# Patient Record
Sex: FEMALE | Race: WHITE | NOT HISPANIC OR LATINO | Employment: UNEMPLOYED | URBAN - METROPOLITAN AREA
[De-identification: names, ages, dates, MRNs, and addresses within clinical notes are randomized per-mention and may not be internally consistent; named-entity substitution may affect disease eponyms.]

---

## 2022-04-11 ENCOUNTER — HISTORICAL (OUTPATIENT)
Dept: ADMINISTRATIVE | Facility: HOSPITAL | Age: 47
End: 2022-04-11

## 2022-04-29 VITALS
WEIGHT: 140.63 LBS | SYSTOLIC BLOOD PRESSURE: 134 MMHG | BODY MASS INDEX: 26.55 KG/M2 | OXYGEN SATURATION: 98 % | DIASTOLIC BLOOD PRESSURE: 78 MMHG | HEIGHT: 61 IN

## 2023-08-21 DIAGNOSIS — I10 HYPERTENSION, UNSPECIFIED TYPE: Primary | ICD-10-CM

## 2023-08-22 RX ORDER — HYDROCHLOROTHIAZIDE 12.5 MG/1
12.5 CAPSULE ORAL
Qty: 30 CAPSULE | Refills: 2 | Status: SHIPPED | OUTPATIENT
Start: 2023-08-22 | End: 2023-11-01

## 2023-11-01 DIAGNOSIS — I10 HYPERTENSION, UNSPECIFIED TYPE: ICD-10-CM

## 2023-11-01 RX ORDER — LISINOPRIL 10 MG/1
10 TABLET ORAL
Qty: 30 TABLET | Refills: 0 | Status: SHIPPED | OUTPATIENT
Start: 2023-11-01 | End: 2023-11-15 | Stop reason: SDUPTHER

## 2023-11-01 RX ORDER — HYDROCHLOROTHIAZIDE 12.5 MG/1
12.5 CAPSULE ORAL
Qty: 30 CAPSULE | Refills: 0 | Status: SHIPPED | OUTPATIENT
Start: 2023-11-01 | End: 2024-03-14

## 2023-11-06 ENCOUNTER — TELEPHONE (OUTPATIENT)
Dept: FAMILY MEDICINE | Facility: CLINIC | Age: 48
End: 2023-11-06
Payer: COMMERCIAL

## 2023-11-06 VITALS
TEMPERATURE: 98 F | OXYGEN SATURATION: 97 % | HEART RATE: 73 BPM | SYSTOLIC BLOOD PRESSURE: 118 MMHG | HEIGHT: 61 IN | BODY MASS INDEX: 27.43 KG/M2 | DIASTOLIC BLOOD PRESSURE: 72 MMHG | WEIGHT: 145.31 LBS

## 2023-11-06 RX ORDER — CONJUGATED ESTROGENS 0.62 MG/G
CREAM VAGINAL
COMMUNITY
Start: 2022-03-16 | End: 2024-03-14

## 2023-11-07 ENCOUNTER — TELEPHONE (OUTPATIENT)
Dept: FAMILY MEDICINE | Facility: CLINIC | Age: 48
End: 2023-11-07
Payer: COMMERCIAL

## 2023-11-09 PROCEDURE — 84443 ASSAY THYROID STIM HORMONE: CPT | Performed by: NURSE PRACTITIONER

## 2023-11-09 PROCEDURE — 85025 COMPLETE CBC W/AUTO DIFF WBC: CPT | Performed by: NURSE PRACTITIONER

## 2023-11-09 PROCEDURE — 83036 HEMOGLOBIN GLYCOSYLATED A1C: CPT | Performed by: NURSE PRACTITIONER

## 2023-11-09 PROCEDURE — 80053 COMPREHEN METABOLIC PANEL: CPT | Performed by: NURSE PRACTITIONER

## 2023-11-09 PROCEDURE — 80061 LIPID PANEL: CPT | Performed by: NURSE PRACTITIONER

## 2023-11-14 ENCOUNTER — TELEPHONE (OUTPATIENT)
Dept: FAMILY MEDICINE | Facility: CLINIC | Age: 48
End: 2023-11-14
Payer: COMMERCIAL

## 2023-11-15 ENCOUNTER — OFFICE VISIT (OUTPATIENT)
Dept: FAMILY MEDICINE | Facility: CLINIC | Age: 48
End: 2023-11-15
Payer: COMMERCIAL

## 2023-11-15 VITALS
TEMPERATURE: 98 F | HEART RATE: 78 BPM | OXYGEN SATURATION: 99 % | SYSTOLIC BLOOD PRESSURE: 120 MMHG | WEIGHT: 153.63 LBS | DIASTOLIC BLOOD PRESSURE: 64 MMHG | BODY MASS INDEX: 29.01 KG/M2 | HEIGHT: 61 IN

## 2023-11-15 DIAGNOSIS — Z00.00 WELLNESS EXAMINATION: Primary | ICD-10-CM

## 2023-11-15 DIAGNOSIS — I10 PRIMARY HYPERTENSION: ICD-10-CM

## 2023-11-15 DIAGNOSIS — I10 HYPERTENSION, UNSPECIFIED TYPE: ICD-10-CM

## 2023-11-15 DIAGNOSIS — D72.819 LEUKOPENIA, UNSPECIFIED TYPE: ICD-10-CM

## 2023-11-15 DIAGNOSIS — J01.10 ACUTE NON-RECURRENT FRONTAL SINUSITIS: ICD-10-CM

## 2023-11-15 DIAGNOSIS — Z12.11 SCREENING FOR COLON CANCER: ICD-10-CM

## 2023-11-15 PROBLEM — E66.3 OVERWEIGHT WITH BODY MASS INDEX (BMI) 25.0-29.9: Status: ACTIVE | Noted: 2023-11-15

## 2023-11-15 PROCEDURE — 3044F PR MOST RECENT HEMOGLOBIN A1C LEVEL <7.0%: ICD-10-PCS | Mod: CPTII,,, | Performed by: NURSE PRACTITIONER

## 2023-11-15 PROCEDURE — 3008F BODY MASS INDEX DOCD: CPT | Mod: CPTII,,, | Performed by: NURSE PRACTITIONER

## 2023-11-15 PROCEDURE — 3074F PR MOST RECENT SYSTOLIC BLOOD PRESSURE < 130 MM HG: ICD-10-PCS | Mod: CPTII,,, | Performed by: NURSE PRACTITIONER

## 2023-11-15 PROCEDURE — 1160F PR REVIEW ALL MEDS BY PRESCRIBER/CLIN PHARMACIST DOCUMENTED: ICD-10-PCS | Mod: CPTII,,, | Performed by: NURSE PRACTITIONER

## 2023-11-15 PROCEDURE — 3078F DIAST BP <80 MM HG: CPT | Mod: CPTII,,, | Performed by: NURSE PRACTITIONER

## 2023-11-15 PROCEDURE — 1160F RVW MEDS BY RX/DR IN RCRD: CPT | Mod: CPTII,,, | Performed by: NURSE PRACTITIONER

## 2023-11-15 PROCEDURE — 3078F PR MOST RECENT DIASTOLIC BLOOD PRESSURE < 80 MM HG: ICD-10-PCS | Mod: CPTII,,, | Performed by: NURSE PRACTITIONER

## 2023-11-15 PROCEDURE — 1159F PR MEDICATION LIST DOCUMENTED IN MEDICAL RECORD: ICD-10-PCS | Mod: CPTII,,, | Performed by: NURSE PRACTITIONER

## 2023-11-15 PROCEDURE — 99396 PR PREVENTIVE VISIT,EST,40-64: ICD-10-PCS | Mod: ,,, | Performed by: NURSE PRACTITIONER

## 2023-11-15 PROCEDURE — 3008F PR BODY MASS INDEX (BMI) DOCUMENTED: ICD-10-PCS | Mod: CPTII,,, | Performed by: NURSE PRACTITIONER

## 2023-11-15 PROCEDURE — 3074F SYST BP LT 130 MM HG: CPT | Mod: CPTII,,, | Performed by: NURSE PRACTITIONER

## 2023-11-15 PROCEDURE — 4010F ACE/ARB THERAPY RXD/TAKEN: CPT | Mod: CPTII,,, | Performed by: NURSE PRACTITIONER

## 2023-11-15 PROCEDURE — 99213 PR OFFICE/OUTPT VISIT, EST, LEVL III, 20-29 MIN: ICD-10-PCS | Mod: 25,,, | Performed by: NURSE PRACTITIONER

## 2023-11-15 PROCEDURE — 99396 PREV VISIT EST AGE 40-64: CPT | Mod: ,,, | Performed by: NURSE PRACTITIONER

## 2023-11-15 PROCEDURE — 99213 OFFICE O/P EST LOW 20 MIN: CPT | Mod: 25,,, | Performed by: NURSE PRACTITIONER

## 2023-11-15 PROCEDURE — 3044F HG A1C LEVEL LT 7.0%: CPT | Mod: CPTII,,, | Performed by: NURSE PRACTITIONER

## 2023-11-15 PROCEDURE — 1159F MED LIST DOCD IN RCRD: CPT | Mod: CPTII,,, | Performed by: NURSE PRACTITIONER

## 2023-11-15 PROCEDURE — 4010F PR ACE/ARB THEARPY RXD/TAKEN: ICD-10-PCS | Mod: CPTII,,, | Performed by: NURSE PRACTITIONER

## 2023-11-15 RX ORDER — AZITHROMYCIN 250 MG/1
TABLET, FILM COATED ORAL
Qty: 6 TABLET | Refills: 0 | Status: SHIPPED | OUTPATIENT
Start: 2023-11-15 | End: 2023-11-20

## 2023-11-15 RX ORDER — LISINOPRIL 10 MG/1
10 TABLET ORAL DAILY
Qty: 90 TABLET | Refills: 3 | Status: SHIPPED | OUTPATIENT
Start: 2023-11-15 | End: 2024-03-14 | Stop reason: SDUPTHER

## 2023-11-15 NOTE — PROGRESS NOTES
Patient ID: Hiral Rees  : 1975    Chief Complaint: wellness    Allergies: Patient is allergic to penicillin and chlorpheniramine-pseudoephed.     History of Present Illness:  The patient is a 48 y.o. White female who presents to clinic for annual wellness visit.    Diet and nutrition:  Diet is high in salt, high in fat, low in fiber, high caloric intake, high carbohydrate meals, high calcium intake.    Fracture risk: No history of fracture, no recent unexplained fracture.    Physical activity:  Does not exercise on a regular basis, good physical condition.    Depression risks:  No history of depression, never feel sad, empty, or tearful, no sleep disturbances, no agitation, no loss of energy, no feelings of worthlessness or guilt, no thoughts of suicide.    Orientation:  No disorientation to time, no disorientation to place.    Concentration and memory:  No decreased concentration ability, no memory lapses or loss, does not forget words.    Speech forward/motor difficulties: No speech difficulties, no difficulty expressing formulated concepts, no difficulty with fine manipulative tasks, no difficulty writing forward/copying, no slowed reaction time, does not knock things over when trying to pick them up.    Fall risk assessment:  No frequent falls while walking, no fall in the past year, no dizziness forward/vertigo, no fear of falling.  Hearing:  No loss of hearing, does not wear hearing aids.    Vision:  No vision problems, does not wear glasses.    Activity of daily living: Able to bathe with limited or no assistance, able to control urination and bowels, able to dress with limited or no assistance, able to feed self with limited or no assistance, able to get out of chair or bed with limited or no assistance, able to [place] with limited or no assistance, able to toilet with limited are no assistance.    Activities of daily living:  Able to do housework with limited or no assistance, able to grocery shop  with limited or no assistance, able to manage medications with limited or no assistance, able to manage money with limited or no assistance, able to prepare meals with limited or no assistance, able to use the phone with limited or no assistance.    Screenings: due for vaccinations, due for breast cancer screening, due for cervical cancer screening, due for colorectal cancer screening.    Patient reports recent low bps. 90s/50s. Denies dizziness, light headedness, chest pain, palpitations, weakness.     Sinus Problem  This is a new problem. The current episode started 1 to 4 weeks ago. The problem has been gradually worsening since onset. There has been no fever. Associated symptoms include congestion, coughing, headaches, a hoarse voice, sinus pressure, sneezing, a sore throat and swollen glands. Pertinent negatives include no chills, diaphoresis, ear pain, neck pain or shortness of breath.        Past Medical History:  has a past medical history of BMI 27.0-27.9,adult, Chronic sinusitis, unspecified, Hypertension, and Leukopenia.    Surgical History:  has a past surgical history that includes Cholecystectomy.    Family History: family history includes Breast cancer in her paternal grandmother; Diabetes type II in her maternal grandmother; Hypertension in her mother.    Social History:  reports that she has never smoked. She has never used smokeless tobacco.    Care Team: Patient Care Team:  Fredrick Mora APRN as PCP - General (Family Medicine)  Christine Murray NP as Nurse Practitioner (Obstetrics and Gynecology)     Current Medications:  Current Outpatient Medications   Medication Instructions    azithromycin (ZITHROMAX Z-SHAAN) 250 MG tablet Take 2 tablets (500 mg total) by mouth once daily for 1 day, THEN 1 tablet (250 mg total) once daily for 4 days.    conjugated estrogens (PREMARIN) vaginal cream   See Instructions, 0.5gms vaginally q hs x 14 then 2x/week, # 30 gm, 6 Refill(s), [facility]: [facility], 155, cm, Height/Length Dosing, 03/16/22 10:53:00 CDT, 65.7, kg, Weight Dosing, 03/16/22 10:53:00 CDT    hydroCHLOROthiazide (MICROZIDE) 12.5 mg, Oral    lisinopriL 10 mg, Oral, Daily       Review of Systems   Constitutional:  Negative for appetite change, chills, diaphoresis, fatigue, fever and unexpected weight change.   HENT:  Positive for nasal congestion, hoarse voice, sinus pressure/congestion, sneezing and sore throat. Negative for ear pain, facial swelling, hearing loss, mouth sores, nosebleeds and trouble swallowing.    Eyes:  Negative for pain, discharge, redness and visual disturbance.   Respiratory:  Positive for cough. Negative for chest tightness and shortness of breath.    Cardiovascular:  Negative for chest pain, palpitations and leg swelling.   Gastrointestinal:  Negative for abdominal pain, blood in stool, constipation, diarrhea and nausea.   Endocrine: Negative for cold intolerance, heat intolerance, polydipsia, polyphagia and polyuria.   Genitourinary:  Negative for difficulty urinating, dysuria, frequency and hematuria.   Musculoskeletal:  Negative for arthralgias, joint swelling, neck pain and joint deformity.   Integumentary:  Negative for color change, rash and mole/lesion.   Neurological:  Positive for headaches. Negative for dizziness, weakness and memory loss.   Hematological:  Negative for adenopathy. Does not bruise/bleed easily.   Psychiatric/Behavioral:  Negative for confusion, sleep disturbance and suicidal ideas.         Visit Vitals  /64 (BP Location: Right arm, Patient Position: Sitting)   Pulse 78   Temp 97.9 °F (36.6 °C)   Ht 5' 1.02" (1.55 m)   Wt 69.7 kg (153 lb 9.6 oz)   SpO2 99%   BMI 29.00 kg/m²       Physical Exam  Vitals reviewed.   Constitutional:       General: She is not in acute distress.     Appearance: Normal appearance.   HENT:      Head: Normocephalic and atraumatic.      Right Ear: Ear canal and external ear normal. A middle ear " effusion is present.      Left Ear: Ear canal and external ear normal. A middle ear effusion is present.      Nose: Rhinorrhea present. No congestion. Rhinorrhea is clear.      Right Turbinates: Enlarged and swollen.      Mouth/Throat:      Mouth: Mucous membranes are moist.      Pharynx: Oropharynx is clear. Posterior oropharyngeal erythema present. No oropharyngeal exudate.   Eyes:      Extraocular Movements: Extraocular movements intact.      Conjunctiva/sclera: Conjunctivae normal.      Pupils: Pupils are equal, round, and reactive to light.   Cardiovascular:      Rate and Rhythm: Normal rate and regular rhythm.      Pulses: Normal pulses.      Heart sounds: No murmur heard.  Pulmonary:      Effort: Pulmonary effort is normal.      Breath sounds: Normal breath sounds.   Abdominal:      General: Bowel sounds are normal.      Palpations: Abdomen is soft.      Tenderness: There is no abdominal tenderness.   Musculoskeletal:         General: No swelling, tenderness or deformity. Normal range of motion.      Cervical back: Normal range of motion and neck supple.   Lymphadenopathy:      Cervical: No cervical adenopathy.   Skin:     General: Skin is warm and dry.      Capillary Refill: Capillary refill takes less than 2 seconds.      Coloration: Skin is not jaundiced.      Findings: No rash.   Neurological:      Mental Status: She is alert and oriented to person, place, and time.      Cranial Nerves: No cranial nerve deficit.   Psychiatric:         Mood and Affect: Mood normal.         Behavior: Behavior normal.          Labs Reviewed:  Chemistry:  Lab Results   Component Value Date     11/09/2023    K 4.6 11/09/2023    CHLORIDE 105 11/09/2023    BUN 19.0 11/09/2023    CREATININE 0.89 11/09/2023    EGFRNORACEVR 79 11/09/2023    GLUCOSE 94 11/09/2023    CALCIUM 10.1 11/09/2023    ALKPHOS 96 11/09/2023    LABPROT 7.5 11/09/2023    ALBUMIN 4.5 11/09/2023    AST 30 11/09/2023    ALT 23 11/09/2023    TSH 1.450  11/09/2023        Lab Results   Component Value Date    HGBA1C 5.2 11/09/2023        Hematology:  Lab Results   Component Value Date    WBC 3.76 (L) 11/09/2023    RBC 4.61 11/09/2023    HGB 13.8 11/09/2023    HCT 41.1 11/09/2023    MCV 89.2 11/09/2023    MCH 29.9 11/09/2023    MCHC 33.6 11/09/2023    RDW 12.0 11/09/2023     11/09/2023    MPV 10.7 11/09/2023       Lipid Panel:  Lab Results   Component Value Date    CHOL 187 11/09/2023    HDL 65 (H) 11/09/2023    DLDL 75.1 11/09/2023    TRIG 133 11/09/2023        Assessment & Plan:  1. Wellness examination  Overview:  Cervical Cancer Screening- established with Christine Murray, last pap with health unit, will request record, next apt 11/27/23  Breast Cancer Screening- request mammogram from health unit  Osteoporosis Screening- not due  Colon Cancer Screening - cologard ordered  Eye Exam- Recommend annually. Declines referral  Dental Exam- Recommend biannually.      2. Primary hypertension  Overview:  continue lisinopril 10 .     Assessment & Plan:  Hold hctz 12.5, monitor bp and notify office of bp greater than 140/90, hand written instructions provided.     Lab Results   Component Value Date    BUN 19.0 11/09/2023    CREATININE 0.89 11/09/2023    EGFRNORACEVR 79 11/09/2023    K 4.6 11/09/2023         Orders:  -     lisinopriL 10 MG tablet; Take 1 tablet (10 mg total) by mouth once daily.  Dispense: 90 tablet; Refill: 3    3. Leukopenia, unspecified type  Assessment & Plan:  Intermittent, low wbc last year wellness. Repeat was normal. Discussed further work up. Patient agrees to repeat in 4 months    Orders:  -     CBC Auto Differential; Future; Expected date: 03/15/2024  -     Path Review, Peripheral Smear; Future; Expected date: 03/15/2024    4. Screening for colon cancer  -     Cologuard Screening (Multitarget Stool DNA); Future; Expected date: 11/15/2023    5. Hypertension, unspecified type  Overview:  continue lisinopril 10 .     Assessment & Plan:  Hold  hctz 12.5, monitor bp and notify office of bp greater than 140/90, hand written instructions provided.     Lab Results   Component Value Date    BUN 19.0 11/09/2023    CREATININE 0.89 11/09/2023    EGFRNORACEVR 79 11/09/2023    K 4.6 11/09/2023           6. Acute non-recurrent frontal sinusitis  -     azithromycin (ZITHROMAX Z-SHAAN) 250 MG tablet; Take 2 tablets (500 mg total) by mouth once daily for 1 day, THEN 1 tablet (250 mg total) once daily for 4 days.  Dispense: 6 tablet; Refill: 0           Vaccinations:    There is no immunization history on file for this patient.    Future Appointments   Date Time Provider Department Center   3/12/2024  9:20 AM LAB, [facility] LABORATORY DRAW STATION [facility] CHRIS [facility]   3/15/2024  8:30 AM Fredrick Mora APRN [facility] JJ [facility]       Follow up for 1) 4 month f/u white count , nonfasting labs prior , 2), 1 year, Wellness, Fasting labs prior. Call sooner if needed.    SHELBY DOMINGUEZ    Lab Frequency Next Occurrence   CBC Auto Differential Once 03/15/2024   Path Review, Peripheral Smear Once 03/15/2024

## 2023-11-15 NOTE — ASSESSMENT & PLAN NOTE
Intermittent, low wbc last year wellness. Repeat was normal. Discussed further work up. Patient agrees to repeat in 4 months

## 2023-11-15 NOTE — ASSESSMENT & PLAN NOTE
Hold hctz 12.5, monitor bp and notify office of bp greater than 140/90, hand written instructions provided.     Lab Results   Component Value Date    BUN 19.0 11/09/2023    CREATININE 0.89 11/09/2023    EGFRNORACEVR 79 11/09/2023    K 4.6 11/09/2023

## 2023-11-16 ENCOUNTER — DOCUMENTATION ONLY (OUTPATIENT)
Dept: ADMINISTRATIVE | Facility: HOSPITAL | Age: 48
End: 2023-11-16
Payer: COMMERCIAL

## 2023-11-16 LAB
HPV 16: NEGATIVE
HPV 18/45 RNA, VAGINAL: NEGATIVE
HUMAN PAPILLOMAVIRUS (HPV): NORMAL
PAP RECOMMENDATION EXT: NORMAL
PAP SMEAR: NORMAL

## 2023-11-16 NOTE — PROGRESS NOTES
Population Health Chart Review & Patient Outreach Details      Health Maintenance Topics Addressed and Outreach Outcomes / Actions Taken:          Cervical Cancer Screening []  Pap Smear Scheduled in Primary Care or OBGYN    []  External Records Requested & Care Team Updated if Applicable       [x]  External Records Uploaded, Care Team Updated, & History Updated if Applicable    []  Patient Declined Scheduling Pap Smear    []  Patient Will Schedule with External Provider & Care Team Updated if Applicable     Felicia Ace MA - Panel Care Coordinator

## 2023-12-20 LAB — NONINV COLON CA DNA+OCC BLD SCRN STL QL: NEGATIVE

## 2024-02-19 PROBLEM — Z00.00 WELLNESS EXAMINATION: Status: RESOLVED | Noted: 2023-11-15 | Resolved: 2024-02-19

## 2024-03-12 PROCEDURE — 85025 COMPLETE CBC W/AUTO DIFF WBC: CPT | Performed by: NURSE PRACTITIONER

## 2024-03-14 ENCOUNTER — OFFICE VISIT (OUTPATIENT)
Dept: FAMILY MEDICINE | Facility: CLINIC | Age: 49
End: 2024-03-14
Payer: COMMERCIAL

## 2024-03-14 VITALS
OXYGEN SATURATION: 97 % | HEART RATE: 82 BPM | HEIGHT: 61 IN | DIASTOLIC BLOOD PRESSURE: 70 MMHG | TEMPERATURE: 98 F | WEIGHT: 150 LBS | BODY MASS INDEX: 28.32 KG/M2 | SYSTOLIC BLOOD PRESSURE: 124 MMHG

## 2024-03-14 DIAGNOSIS — D72.819 LEUKOPENIA, UNSPECIFIED TYPE: Primary | ICD-10-CM

## 2024-03-14 DIAGNOSIS — I10 PRIMARY HYPERTENSION: ICD-10-CM

## 2024-03-14 DIAGNOSIS — J01.10 ACUTE NON-RECURRENT FRONTAL SINUSITIS: ICD-10-CM

## 2024-03-14 DIAGNOSIS — N95.1 HOT FLASHES DUE TO MENOPAUSE: ICD-10-CM

## 2024-03-14 DIAGNOSIS — F32.A ANXIETY AND DEPRESSION: ICD-10-CM

## 2024-03-14 DIAGNOSIS — F41.9 ANXIETY AND DEPRESSION: ICD-10-CM

## 2024-03-14 PROCEDURE — 3074F SYST BP LT 130 MM HG: CPT | Mod: CPTII,,, | Performed by: NURSE PRACTITIONER

## 2024-03-14 PROCEDURE — 99214 OFFICE O/P EST MOD 30 MIN: CPT | Mod: ,,, | Performed by: NURSE PRACTITIONER

## 2024-03-14 PROCEDURE — 3078F DIAST BP <80 MM HG: CPT | Mod: CPTII,,, | Performed by: NURSE PRACTITIONER

## 2024-03-14 PROCEDURE — 1159F MED LIST DOCD IN RCRD: CPT | Mod: CPTII,,, | Performed by: NURSE PRACTITIONER

## 2024-03-14 PROCEDURE — 3008F BODY MASS INDEX DOCD: CPT | Mod: CPTII,,, | Performed by: NURSE PRACTITIONER

## 2024-03-14 PROCEDURE — 1160F RVW MEDS BY RX/DR IN RCRD: CPT | Mod: CPTII,,, | Performed by: NURSE PRACTITIONER

## 2024-03-14 PROCEDURE — 4010F ACE/ARB THERAPY RXD/TAKEN: CPT | Mod: CPTII,,, | Performed by: NURSE PRACTITIONER

## 2024-03-14 RX ORDER — ESCITALOPRAM OXALATE 10 MG/1
10 TABLET ORAL NIGHTLY
Qty: 90 TABLET | Refills: 3 | Status: SHIPPED | OUTPATIENT
Start: 2024-03-14 | End: 2024-04-23 | Stop reason: SDUPTHER

## 2024-03-14 RX ORDER — AZITHROMYCIN 250 MG/1
TABLET, FILM COATED ORAL
Qty: 6 TABLET | Refills: 0 | Status: SHIPPED | OUTPATIENT
Start: 2024-03-14 | End: 2024-03-19

## 2024-03-14 RX ORDER — LISINOPRIL 10 MG/1
10 TABLET ORAL DAILY
Qty: 90 TABLET | Refills: 3 | Status: SHIPPED | OUTPATIENT
Start: 2024-03-14

## 2024-03-14 NOTE — PROGRESS NOTES
Patient ID: Hiral Rees  : 1975     Chief Complaint: Results (Lab work )    Allergies: Patient is allergic to penicillin and chlorpheniramine-pseudoephed.     History of Present Illness:  The patient is a 48 y.o. White female who presents to clinic for evaluation and management with a chief complaint of Results (Lab work )   Took lexapro in the past after passing of her mother.     Sinus Problem  This is a new problem. The current episode started 1 to 4 weeks ago. The problem has been gradually worsening since onset. There has been no fever. Associated symptoms include congestion, coughing, headaches, a hoarse voice, sinus pressure, sneezing, a sore throat and swollen glands. Pertinent negatives include no chills, diaphoresis, ear pain, neck pain or shortness of breath.   Hypertension  This is a chronic problem. The current episode started more than 1 year ago. The problem is controlled. Associated symptoms include anxiety and headaches. Pertinent negatives include no blurred vision, chest pain, malaise/fatigue, neck pain, orthopnea, palpitations, peripheral edema, PND, shortness of breath or sweats. Past treatments include ACE inhibitors. The current treatment provides significant improvement.   Anxiety  Presents for initial visit. Onset was 1 to 6 months ago. The problem has been waxing and waning. Symptoms include depressed mood, excessive worry, irritability and nervous/anxious behavior. Patient reports no chest pain, compulsions, confusion, decreased concentration, dizziness, dry mouth, feeling of choking, hyperventilation, impotence, insomnia, malaise, muscle tension, nausea, obsessions, palpitations, panic, restlessness, shortness of breath or suicidal ideas. Symptoms occur most days. The severity of symptoms is interfering with daily activities.            Past Medical History:  has a past medical history of BMI 27.0-27.9,adult, Chronic sinusitis, unspecified, Hypertension, and  "Leukopenia.    Social History:  reports that she has never smoked. She has never used smokeless tobacco.    Care Team: Patient Care Team:  Fredrick Mora APRN as PCP - General (Family Medicine)  Christine Murray NP as Nurse Practitioner (Obstetrics and Gynecology)     Current Medications:  Current Outpatient Medications   Medication Instructions    azithromycin (Z-SHAAN) 250 MG tablet Take 2 tablets by mouth on day 1; Take 1 tablet by mouth on days 2-5<BR>    EScitalopram oxalate (LEXAPRO) 10 mg, Oral, Nightly    lisinopriL 10 mg, Oral, Daily       Review of Systems   Constitutional:  Positive for irritability. Negative for chills, diaphoresis and malaise/fatigue.   HENT:  Positive for nasal congestion, hoarse voice, sinus pressure/congestion, sneezing and sore throat. Negative for ear pain.    Eyes:  Negative for blurred vision.   Respiratory:  Positive for cough. Negative for shortness of breath.    Cardiovascular:  Negative for chest pain, palpitations, orthopnea and PND.   Gastrointestinal:  Negative for nausea.   Genitourinary:  Negative for impotence.   Musculoskeletal:  Negative for neck pain.   Neurological:  Positive for headaches. Negative for dizziness.   Psychiatric/Behavioral:  Negative for confusion, decreased concentration and suicidal ideas. The patient is nervous/anxious. The patient does not have insomnia.         Visit Vitals  /70 (BP Location: Left arm, Patient Position: Sitting, BP Method: Medium (Manual))   Pulse 82   Temp 98.2 °F (36.8 °C) (Skin)   Ht 5' 1.02" (1.55 m)   Wt 68 kg (150 lb)   SpO2 97%   BMI 28.32 kg/m²       Physical Exam  Vitals reviewed.   Constitutional:       General: She is not in acute distress.     Appearance: Normal appearance.   HENT:      Head: Normocephalic and atraumatic.      Right Ear: Ear canal and external ear normal. A middle ear effusion is present.      Left Ear: Ear canal and external ear normal. A middle ear effusion is present.      Nose: " Rhinorrhea present. No congestion. Rhinorrhea is purulent.      Left Turbinates: Enlarged and swollen.      Left Sinus: Frontal sinus tenderness present.      Mouth/Throat:      Mouth: Mucous membranes are moist.      Pharynx: Oropharynx is clear. Posterior oropharyngeal erythema present. No oropharyngeal exudate.   Eyes:      Conjunctiva/sclera: Conjunctivae normal.   Cardiovascular:      Rate and Rhythm: Normal rate and regular rhythm.      Pulses: Normal pulses.      Heart sounds: No murmur heard.  Pulmonary:      Effort: Pulmonary effort is normal.      Breath sounds: Normal breath sounds.   Abdominal:      General: Bowel sounds are normal.      Palpations: Abdomen is soft.      Tenderness: There is no abdominal tenderness.   Musculoskeletal:         General: No swelling, tenderness or deformity. Normal range of motion.      Cervical back: Normal range of motion and neck supple.   Lymphadenopathy:      Cervical: No cervical adenopathy.   Skin:     General: Skin is warm and dry.      Capillary Refill: Capillary refill takes less than 2 seconds.      Coloration: Skin is not jaundiced.      Findings: No rash.   Neurological:      Mental Status: She is alert and oriented to person, place, and time.      Cranial Nerves: No cranial nerve deficit.   Psychiatric:         Mood and Affect: Mood normal.         Behavior: Behavior normal.          Labs Reviewed:  Chemistry:  Lab Results   Component Value Date     11/09/2023    K 4.6 11/09/2023    CHLORIDE 105 11/09/2023    BUN 19.0 11/09/2023    CREATININE 0.89 11/09/2023    EGFRNORACEVR 79 11/09/2023    GLUCOSE 94 11/09/2023    CALCIUM 10.1 11/09/2023    ALKPHOS 96 11/09/2023    LABPROT 7.5 11/09/2023    ALBUMIN 4.5 11/09/2023    AST 30 11/09/2023    ALT 23 11/09/2023    TSH 1.450 11/09/2023        Lab Results   Component Value Date    HGBA1C 5.2 11/09/2023        Hematology:  Lab Results   Component Value Date    WBC 3.24 (L) 03/12/2024    RBC 4.72 03/12/2024    HGB  13.9 03/12/2024    HCT 42.1 03/12/2024    MCV 89.2 03/12/2024    MCH 29.4 03/12/2024    MCHC 33.0 03/12/2024    RDW 11.8 03/12/2024     03/12/2024    MPV 10.3 03/12/2024       Lipid Panel:  Lab Results   Component Value Date    CHOL 187 11/09/2023    HDL 65 (H) 11/09/2023    DLDL 75.1 11/09/2023    TRIG 133 11/09/2023        Assessment & Plan:  1. Leukopenia, unspecified type  Comments:  reviewed with rigo Junior , continue to monitor    2. Primary hypertension  Overview:  continue lisinopril 10 .     Orders:  -     lisinopriL 10 MG tablet; Take 1 tablet (10 mg total) by mouth once daily.  Dispense: 90 tablet; Refill: 3    3. Anxiety and depression  -     EScitalopram oxalate (LEXAPRO) 10 MG tablet; Take 1 tablet (10 mg total) by mouth every evening.  Dispense: 90 tablet; Refill: 3    4. Hot flashes due to menopause  -     EScitalopram oxalate (LEXAPRO) 10 MG tablet; Take 1 tablet (10 mg total) by mouth every evening.  Dispense: 90 tablet; Refill: 3    5. Acute non-recurrent frontal sinusitis  -     azithromycin (Z-SHAAN) 250 MG tablet; Take 2 tablets by mouth on day 1; Take 1 tablet by mouth on days 2-5  Dispense: 6 tablet; Refill: 0         Future Appointments   Date Time Provider Department Center   11/12/2024  8:40 AM LAB, [facility] LABORATORY DRAW STATION [facility] CHRIS [facility]   11/19/2024  9[facility]00 [facility]       Follow up for 6 wk f/u, Depression/Anxiety. Call sooner if needed.    SHELBY DOMINGUEZ

## 2024-03-28 LAB — BCS RECOMMENDATION EXT: NORMAL

## 2024-04-23 ENCOUNTER — PATIENT OUTREACH (OUTPATIENT)
Dept: ADMINISTRATIVE | Facility: HOSPITAL | Age: 49
End: 2024-04-23
Payer: COMMERCIAL

## 2024-04-23 ENCOUNTER — OFFICE VISIT (OUTPATIENT)
Dept: FAMILY MEDICINE | Facility: CLINIC | Age: 49
End: 2024-04-23
Payer: COMMERCIAL

## 2024-04-23 VITALS
WEIGHT: 150 LBS | DIASTOLIC BLOOD PRESSURE: 68 MMHG | HEART RATE: 76 BPM | OXYGEN SATURATION: 96 % | TEMPERATURE: 98 F | SYSTOLIC BLOOD PRESSURE: 110 MMHG | BODY MASS INDEX: 28.32 KG/M2 | HEIGHT: 61 IN

## 2024-04-23 DIAGNOSIS — F32.A ANXIETY AND DEPRESSION: Primary | ICD-10-CM

## 2024-04-23 DIAGNOSIS — J01.01 ACUTE RECURRENT MAXILLARY SINUSITIS: ICD-10-CM

## 2024-04-23 DIAGNOSIS — F41.9 ANXIETY AND DEPRESSION: Primary | ICD-10-CM

## 2024-04-23 DIAGNOSIS — N95.1 HOT FLASHES DUE TO MENOPAUSE: ICD-10-CM

## 2024-04-23 PROCEDURE — 3074F SYST BP LT 130 MM HG: CPT | Mod: CPTII,,, | Performed by: NURSE PRACTITIONER

## 2024-04-23 PROCEDURE — 1160F RVW MEDS BY RX/DR IN RCRD: CPT | Mod: CPTII,,, | Performed by: NURSE PRACTITIONER

## 2024-04-23 PROCEDURE — 1159F MED LIST DOCD IN RCRD: CPT | Mod: CPTII,,, | Performed by: NURSE PRACTITIONER

## 2024-04-23 PROCEDURE — 99214 OFFICE O/P EST MOD 30 MIN: CPT | Mod: ,,, | Performed by: NURSE PRACTITIONER

## 2024-04-23 PROCEDURE — 3078F DIAST BP <80 MM HG: CPT | Mod: CPTII,,, | Performed by: NURSE PRACTITIONER

## 2024-04-23 PROCEDURE — 3008F BODY MASS INDEX DOCD: CPT | Mod: CPTII,,, | Performed by: NURSE PRACTITIONER

## 2024-04-23 PROCEDURE — 4010F ACE/ARB THERAPY RXD/TAKEN: CPT | Mod: CPTII,,, | Performed by: NURSE PRACTITIONER

## 2024-04-23 RX ORDER — ESCITALOPRAM OXALATE 10 MG/1
10 TABLET ORAL NIGHTLY
Qty: 90 TABLET | Refills: 1 | Status: SHIPPED | OUTPATIENT
Start: 2024-04-23 | End: 2025-04-23

## 2024-04-23 RX ORDER — DOXYCYCLINE 100 MG/1
100 CAPSULE ORAL EVERY 12 HOURS
Qty: 20 CAPSULE | Refills: 0 | Status: SHIPPED | OUTPATIENT
Start: 2024-04-23 | End: 2024-05-03

## 2024-04-23 NOTE — PROGRESS NOTES
Patient ID: Hiral Rees  : 1975     Chief Complaint: Anxiety and Depression    Allergies: Patient is allergic to penicillin and chlorpheniramine-pseudoephed.     History of Present Illness:  The patient is a 48 y.o. White female who presents to clinic for evaluation and management with a chief complaint of Anxiety and Depression   Took lexapro in the past after passing of her mother.  Restarted last month and reports symptoms much improved.  Declines desire to change medication or dose at this time.  Patient reports sinus symptoms had improved with antibiotic but returned over a week ago and symptoms are worsening.  Admits to head pressure congestion, fullness in ears.    Sinus Problem  This is a new problem. The current episode started 1 to 4 weeks ago. The problem has been gradually worsening since onset. There has been no fever. Associated symptoms include congestion, coughing, a hoarse voice, sinus pressure, sneezing, a sore throat and swollen glands. Pertinent negatives include no chills, diaphoresis or ear pain.   Anxiety  Presents for initial visit. Onset was 1 to 6 months ago. The problem has been waxing and waning. Symptoms include depressed mood, excessive worry, irritability and nervous/anxious behavior. Patient reports no compulsions, confusion, decreased concentration, dizziness, dry mouth, feeling of choking, hyperventilation, impotence, insomnia, malaise, muscle tension, nausea, obsessions, panic, restlessness or suicidal ideas. Symptoms occur most days. The severity of symptoms is interfering with daily activities.            Past Medical History:  has a past medical history of BMI 27.0-27.9,adult, Chronic sinusitis, unspecified, Hypertension, and Leukopenia.    Social History:  reports that she has never smoked. She has never used smokeless tobacco. She reports that she does not drink alcohol and does not use drugs.    Care Team: Patient Care Team:  Fredrick Mora APRN as PCP - General  "(Family Medicine)  Christine Murray, KRYSTAL as Nurse Practitioner (Obstetrics and Gynecology)     Current Medications:  Current Outpatient Medications   Medication Instructions    doxycycline (VIBRAMYCIN) 100 mg, Oral, Every 12 hours    EScitalopram oxalate (LEXAPRO) 10 mg, Oral, Nightly    lisinopriL 10 mg, Oral, Daily       Review of Systems   Constitutional:  Positive for irritability. Negative for chills and diaphoresis.   HENT:  Positive for nasal congestion, hoarse voice, sinus pressure/congestion, sneezing and sore throat. Negative for ear pain.    Respiratory:  Positive for cough.    Gastrointestinal:  Negative for nausea.   Genitourinary:  Negative for impotence.   Neurological:  Negative for dizziness.   Psychiatric/Behavioral:  Negative for confusion, decreased concentration and suicidal ideas. The patient is nervous/anxious. The patient does not have insomnia.         Visit Vitals  /68 (BP Location: Right arm)   Pulse 76   Temp 97.9 °F (36.6 °C) (Temporal)   Ht 5' 1.02" (1.55 m)   Wt 68 kg (150 lb)   SpO2 96%   BMI 28.32 kg/m²       Physical Exam  Vitals reviewed.   Constitutional:       General: She is not in acute distress.     Appearance: Normal appearance.   HENT:      Head: Normocephalic and atraumatic.      Right Ear: Ear canal and external ear normal. A middle ear effusion is present.      Left Ear: Ear canal and external ear normal. A middle ear effusion is present.      Nose: Rhinorrhea present. No congestion. Rhinorrhea is purulent.      Left Turbinates: Enlarged and swollen.      Left Sinus: Frontal sinus tenderness present.      Mouth/Throat:      Mouth: Mucous membranes are moist.      Pharynx: Oropharynx is clear. Posterior oropharyngeal erythema present. No oropharyngeal exudate.   Eyes:      Conjunctiva/sclera: Conjunctivae normal.   Cardiovascular:      Rate and Rhythm: Normal rate and regular rhythm.      Pulses: Normal pulses.      Heart sounds: No murmur heard.  Pulmonary:    "   Effort: Pulmonary effort is normal.      Breath sounds: Normal breath sounds.   Abdominal:      General: Bowel sounds are normal.      Palpations: Abdomen is soft.      Tenderness: There is no abdominal tenderness.   Musculoskeletal:         General: No swelling, tenderness or deformity. Normal range of motion.      Cervical back: Normal range of motion and neck supple.   Lymphadenopathy:      Cervical: No cervical adenopathy.   Skin:     General: Skin is warm and dry.      Capillary Refill: Capillary refill takes less than 2 seconds.      Coloration: Skin is not jaundiced.      Findings: No rash.   Neurological:      Mental Status: She is alert and oriented to person, place, and time.      Cranial Nerves: No cranial nerve deficit.   Psychiatric:         Mood and Affect: Mood normal.         Behavior: Behavior normal.          Labs Reviewed:  Chemistry:  Lab Results   Component Value Date     11/09/2023    K 4.6 11/09/2023    CHLORIDE 105 11/09/2023    BUN 19.0 11/09/2023    CREATININE 0.89 11/09/2023    EGFRNORACEVR 79 11/09/2023    GLUCOSE 94 11/09/2023    CALCIUM 10.1 11/09/2023    ALKPHOS 96 11/09/2023    LABPROT 7.5 11/09/2023    ALBUMIN 4.5 11/09/2023    AST 30 11/09/2023    ALT 23 11/09/2023    TSH 1.450 11/09/2023        Lab Results   Component Value Date    HGBA1C 5.2 11/09/2023        Hematology:  Lab Results   Component Value Date    WBC 3.24 (L) 03/12/2024    RBC 4.72 03/12/2024    HGB 13.9 03/12/2024    HCT 42.1 03/12/2024    MCV 89.2 03/12/2024    MCH 29.4 03/12/2024    MCHC 33.0 03/12/2024    RDW 11.8 03/12/2024     03/12/2024    MPV 10.3 03/12/2024       Lipid Panel:  Lab Results   Component Value Date    CHOL 187 11/09/2023    HDL 65 (H) 11/09/2023    DLDL 75.1 11/09/2023    TRIG 133 11/09/2023        Assessment & Plan:  1. Anxiety and depression  Overview:  Mood stable with lexapro 10mg    Orders:  -     EScitalopram oxalate (LEXAPRO) 10 MG tablet; Take 1 tablet (10 mg total) by  mouth every evening.  Dispense: 90 tablet; Refill: 1    2. Hot flashes due to menopause  Overview:  Symptoms improved with lexapro    Orders:  -     EScitalopram oxalate (LEXAPRO) 10 MG tablet; Take 1 tablet (10 mg total) by mouth every evening.  Dispense: 90 tablet; Refill: 1    3. Acute recurrent maxillary sinusitis  Comments:  complete doxycycline, patient will notify if interested in ENT referral  Orders:  -     doxycycline (VIBRAMYCIN) 100 MG Cap; Take 1 capsule (100 mg total) by mouth every 12 (twelve) hours. for 10 days  Dispense: 20 capsule; Refill: 0         Future Appointments   Date Time Provider Department Center   11/12/2024  8:40 AM LAB, [facility] [place] LABORATORY DRAW STATION [facility] [place] CHRIS [facility]con   11/19/2024  9:00 AM Fredrick Mora APRN [facility] [place] JJ Rascon       Follow up for Keep Apt as Scheduled. Call sooner if needed.    SHELBY DOMINGUEZ

## 2024-04-23 NOTE — PROGRESS NOTES
Health Maintenance Topic(s) Outreach Outcomes & Actions Taken:    Breast Cancer Screening - Outreach Outcomes & Actions Taken  : External Records Uploaded & Care Team Updated if Applicable       Additional Notes:  Upload Mammogram Report 3/28/2024

## 2024-11-19 ENCOUNTER — OFFICE VISIT (OUTPATIENT)
Dept: FAMILY MEDICINE | Facility: CLINIC | Age: 49
End: 2024-11-19
Payer: COMMERCIAL

## 2024-11-19 VITALS
WEIGHT: 155.63 LBS | BODY MASS INDEX: 29.38 KG/M2 | TEMPERATURE: 98 F | SYSTOLIC BLOOD PRESSURE: 128 MMHG | HEIGHT: 61 IN | DIASTOLIC BLOOD PRESSURE: 72 MMHG | OXYGEN SATURATION: 96 % | HEART RATE: 84 BPM

## 2024-11-19 DIAGNOSIS — Z00.00 WELLNESS EXAMINATION: Primary | ICD-10-CM

## 2024-11-19 DIAGNOSIS — Z82.49 FAMILY HISTORY OF ABDOMINAL AORTIC ANEURYSM (AAA): ICD-10-CM

## 2024-11-19 DIAGNOSIS — I10 PRIMARY HYPERTENSION: ICD-10-CM

## 2024-11-19 DIAGNOSIS — N95.1 HOT FLASHES DUE TO MENOPAUSE: ICD-10-CM

## 2024-11-19 DIAGNOSIS — F41.9 ANXIETY AND DEPRESSION: ICD-10-CM

## 2024-11-19 DIAGNOSIS — F32.A ANXIETY AND DEPRESSION: ICD-10-CM

## 2024-11-19 PROCEDURE — 3008F BODY MASS INDEX DOCD: CPT | Mod: CPTII,,, | Performed by: NURSE PRACTITIONER

## 2024-11-19 PROCEDURE — 3044F HG A1C LEVEL LT 7.0%: CPT | Mod: CPTII,,, | Performed by: NURSE PRACTITIONER

## 2024-11-19 PROCEDURE — 3078F DIAST BP <80 MM HG: CPT | Mod: CPTII,,, | Performed by: NURSE PRACTITIONER

## 2024-11-19 PROCEDURE — 99396 PREV VISIT EST AGE 40-64: CPT | Mod: ,,, | Performed by: NURSE PRACTITIONER

## 2024-11-19 PROCEDURE — 4010F ACE/ARB THERAPY RXD/TAKEN: CPT | Mod: CPTII,,, | Performed by: NURSE PRACTITIONER

## 2024-11-19 PROCEDURE — 3074F SYST BP LT 130 MM HG: CPT | Mod: CPTII,,, | Performed by: NURSE PRACTITIONER

## 2024-11-19 PROCEDURE — 1159F MED LIST DOCD IN RCRD: CPT | Mod: CPTII,,, | Performed by: NURSE PRACTITIONER

## 2024-11-19 PROCEDURE — 1160F RVW MEDS BY RX/DR IN RCRD: CPT | Mod: CPTII,,, | Performed by: NURSE PRACTITIONER

## 2024-11-19 RX ORDER — ESCITALOPRAM OXALATE 10 MG/1
10 TABLET ORAL NIGHTLY
Qty: 90 TABLET | Refills: 3 | Status: SHIPPED | OUTPATIENT
Start: 2024-11-19 | End: 2025-11-19

## 2024-11-19 RX ORDER — LISINOPRIL 10 MG/1
10 TABLET ORAL DAILY
Qty: 90 TABLET | Refills: 3 | Status: SHIPPED | OUTPATIENT
Start: 2024-11-19

## 2024-11-19 NOTE — PROGRESS NOTES
Patient ID: Hiral Rees  : 1975    Chief Complaint: Annual Exam (wellness)    Allergies: Patient is allergic to penicillin and chlorpheniramine-pseudoephed.     History of Present Illness:  The patient is a 49 y.o. White female who presents to clinic for annual wellness visit.    Diet and nutrition:  Diet is high in salt, high in fat, low in fiber, high caloric intake, high carbohydrate meals, high calcium intake.    Fracture risk: No history of fracture, no recent unexplained fracture.    Physical activity:  Does not exercise on a regular basis, good physical condition.    Depression risks:  + history of depression, never feel sad, empty, or tearful, no sleep disturbances, no agitation, no loss of energy, no feelings of worthlessness or guilt, no thoughts of suicide.    Orientation:  No disorientation to time, no disorientation to place.    Concentration and memory:  No decreased concentration ability, no memory lapses or loss, does not forget words.    Speech forward/motor difficulties: No speech difficulties, no difficulty expressing formulated concepts, no difficulty with fine manipulative tasks, no difficulty writing forward/copying, no slowed reaction time, does not knock things over when trying to pick them up.    Fall risk assessment:  No frequent falls while walking, no fall in the past year, no dizziness forward/vertigo, no fear of falling.  Hearing:  No loss of hearing, does not wear hearing aids.    Vision:  No vision problems, does not wear glasses.    Activity of daily living: Able to bathe with limited or no assistance, able to control urination and bowels, able to dress with limited or no assistance, able to feed self with limited or no assistance, able to get out of chair or bed with limited or no assistance, able to [place] with limited or no assistance, able to toilet with limited are no assistance.    Activities of daily living:  Able to do housework with limited or no assistance, able to  grocery shop with limited or no assistance, able to manage medications with limited or no assistance, able to manage money with limited or no assistance, able to prepare meals with limited or no assistance, able to use the phone with limited or no assistance.    Screenings: due for vaccinations, not due for breast cancer screening, not due for cervical cancer screening, not due for colorectal cancer screening.         Past Medical History:  has a past medical history of BMI 27.0-27.9,adult, Chronic sinusitis, unspecified, Hypertension, and Leukopenia.    Surgical History:  has a past surgical history that includes Cholecystectomy.    Family History: family history includes Breast cancer in her paternal grandmother; Diabetes type II in her maternal grandmother; Hypertension in her mother; Stroke in her mother.    Social History:  reports that she has never smoked. She has never used smokeless tobacco. She reports that she does not drink alcohol and does not use drugs.    Care Team: Patient Care Team:  Fredrick Mora APRN as PCP - General (Family Medicine)  Christine Murray NP as Nurse Practitioner (Obstetrics and Gynecology)     Current Medications:  Current Outpatient Medications   Medication Instructions    EScitalopram oxalate (LEXAPRO) 10 mg, Oral, Nightly    lisinopriL 10 mg, Oral, Daily       Review of Systems   Constitutional:  Negative for appetite change, chills, diaphoresis, fatigue, fever and unexpected weight change.   HENT:  Negative for nasal congestion, ear pain, facial swelling, hearing loss, mouth sores, nosebleeds, sinus pressure/congestion, sneezing, sore throat and trouble swallowing.    Eyes:  Negative for pain, discharge, redness and visual disturbance.   Respiratory:  Negative for cough, chest tightness and shortness of breath.    Cardiovascular:  Negative for chest pain, palpitations and leg swelling.   Gastrointestinal:  Negative for abdominal pain, blood in stool, constipation,  "diarrhea and nausea.   Endocrine: Negative for cold intolerance, heat intolerance, polydipsia, polyphagia and polyuria.   Genitourinary:  Negative for difficulty urinating, dysuria, frequency and hematuria.   Musculoskeletal:  Negative for arthralgias, joint swelling, neck pain and joint deformity.   Integumentary:  Negative for color change, rash and mole/lesion.   Neurological:  Negative for dizziness, weakness and memory loss.   Hematological:  Negative for adenopathy. Does not bruise/bleed easily.   Psychiatric/Behavioral:  Negative for confusion, sleep disturbance and suicidal ideas.         Visit Vitals  /72 (BP Location: Left arm, Patient Position: Sitting)   Pulse 84   Temp 97.5 °F (36.4 °C) (Temporal)   Ht 5' 1.02" (1.55 m)   Wt 70.6 kg (155 lb 9.6 oz)   SpO2 96%   BMI 29.38 kg/m²       Physical Exam  Vitals reviewed.   Constitutional:       General: She is not in acute distress.     Appearance: Normal appearance.   HENT:      Head: Normocephalic and atraumatic.      Right Ear: Tympanic membrane, ear canal and external ear normal.      Left Ear: Tympanic membrane, ear canal and external ear normal.      Nose: No congestion or rhinorrhea.      Mouth/Throat:      Mouth: Mucous membranes are moist.      Pharynx: Oropharynx is clear. No oropharyngeal exudate.   Eyes:      Extraocular Movements: Extraocular movements intact.      Conjunctiva/sclera: Conjunctivae normal.      Pupils: Pupils are equal, round, and reactive to light.   Cardiovascular:      Rate and Rhythm: Normal rate and regular rhythm.      Pulses: Normal pulses.      Heart sounds: No murmur heard.  Pulmonary:      Effort: Pulmonary effort is normal.      Breath sounds: Normal breath sounds.   Abdominal:      General: Bowel sounds are normal.      Palpations: Abdomen is soft.      Tenderness: There is no abdominal tenderness.   Musculoskeletal:         General: No swelling, tenderness or deformity. Normal range of motion.      Cervical back: " Normal range of motion and neck supple.   Lymphadenopathy:      Cervical: No cervical adenopathy.   Skin:     General: Skin is warm and dry.      Capillary Refill: Capillary refill takes less than 2 seconds.      Coloration: Skin is not jaundiced.      Findings: No rash.   Neurological:      Mental Status: She is alert and oriented to person, place, and time.      Cranial Nerves: No cranial nerve deficit.   Psychiatric:         Mood and Affect: Mood normal.         Behavior: Behavior normal.          Labs Reviewed:  Chemistry:  Lab Results   Component Value Date     11/12/2024    K 4.4 11/12/2024    BUN 15 11/12/2024    CREATININE 0.87 11/12/2024    EGFRNORACEVR 81 11/12/2024    GLUCOSE 94 11/09/2023    CALCIUM 9.0 11/12/2024    ALKPHOS 96 11/09/2023    LABPROT 7.5 11/09/2023    ALBUMIN 4.3 11/12/2024    AST 24 11/12/2024    ALT 21 11/12/2024    TSH 3.170 11/12/2024        Lab Results   Component Value Date    HGBA1C 5.4 11/12/2024        Hematology:  Lab Results   Component Value Date    WBC 4.1 11/12/2024    RBC 4.37 11/12/2024    HGB 12.8 11/12/2024    HCT 40.3 11/12/2024    MCV 92 11/12/2024    MCH 29.3 11/12/2024    MCHC 31.8 11/12/2024    RDW 12.1 11/12/2024     11/12/2024    MPV 10.3 03/12/2024    MONO 12 11/12/2024    MONO 0.5 11/12/2024    BASO 0.0 11/12/2024    EOSINOPHIL 7 11/12/2024    BASOPHIL 1 11/12/2024       Lipid Panel:  Lab Results   Component Value Date    CHOL 191 11/12/2024    HDL 57 11/12/2024    LDLDIRECT 75.1 11/09/2023    LDLCALC 110 (H) 11/12/2024    TRIG 138 11/12/2024         Assessment & Plan:  1. Wellness examination  Overview:  Cervical Cancer Screening- established with Christine Murray  Breast Cancer Screening- completed this year  Osteoporosis Screening- not due  Colon Cancer Screening - cologard negative 12/2023  Eye Exam- Recommend annually. Declines referral  Dental Exam- Recommend biannually.      2. Anxiety and depression  Overview:  Mood stable with lexapro  10mg    Orders:  -     EScitalopram oxalate (LEXAPRO) 10 MG tablet; Take 1 tablet (10 mg total) by mouth every evening.  Dispense: 90 tablet; Refill: 3    3. Hot flashes due to menopause  Overview:  Symptoms improved with lexapro    Orders:  -     EScitalopram oxalate (LEXAPRO) 10 MG tablet; Take 1 tablet (10 mg total) by mouth every evening.  Dispense: 90 tablet; Refill: 3    4. Primary hypertension  Overview:  continue lisinopril 10 .     Orders:  -     lisinopriL 10 MG tablet; Take 1 tablet (10 mg total) by mouth once daily.  Dispense: 90 tablet; Refill: 3    5. Family history of abdominal aortic aneurysm (AAA)  Overview:  Brother age 56. Patient will notify if desires u/s for screening           Vaccinations:  Immunization History   Administered Date(s) Administered    COVID-19, vector-nr, rS-Ad26, PF (John) 03/23/2021    DTaP 02/28/1974, 04/18/1974, 07/11/1974, 03/20/1975, 03/30/1978    IPV 02/28/1974, 04/18/1974, 07/11/1974, 03/20/1975, 03/30/1978    MMR 11/07/1974    Mumps 03/30/1978    Td (ADULT) 09/05/1989, 03/09/1999    Tdap 10/06/2009       Future Appointments   Date Time Provider Department Center   11/18/2025  8:30 AM LAB, [facility] LABORATORY DRAW STATION [facility] CHRIS [facility]   11/25/2025 10:00 AM Fredrick Mora APRN [facility]       Follow up for 1yr, Wellness, Fasting labs prior. Call sooner if needed.    SHELBY DOMINGUEZ

## 2025-02-13 ENCOUNTER — OFFICE VISIT (OUTPATIENT)
Dept: FAMILY MEDICINE | Facility: CLINIC | Age: 50
End: 2025-02-13
Payer: COMMERCIAL

## 2025-02-13 VITALS
WEIGHT: 158.38 LBS | HEIGHT: 61 IN | BODY MASS INDEX: 29.9 KG/M2 | DIASTOLIC BLOOD PRESSURE: 72 MMHG | TEMPERATURE: 98 F | HEART RATE: 71 BPM | OXYGEN SATURATION: 98 % | SYSTOLIC BLOOD PRESSURE: 124 MMHG

## 2025-02-13 DIAGNOSIS — L03.011 INFECTED NAILBED OF FINGER, RIGHT: Primary | ICD-10-CM

## 2025-02-13 DIAGNOSIS — J02.9 SORE THROAT: ICD-10-CM

## 2025-02-13 DIAGNOSIS — J06.9 ACUTE UPPER RESPIRATORY INFECTION: ICD-10-CM

## 2025-02-13 LAB
CTP QC/QA: YES
CTP QC/QA: YES
POC MOLECULAR INFLUENZA A AGN: NEGATIVE
POC MOLECULAR INFLUENZA B AGN: NEGATIVE
SARS-COV-2 RDRP RESP QL NAA+PROBE: NEGATIVE

## 2025-02-13 RX ORDER — CLINDAMYCIN HYDROCHLORIDE 300 MG/1
300 CAPSULE ORAL EVERY 8 HOURS
Qty: 30 CAPSULE | Refills: 0 | Status: SHIPPED | OUTPATIENT
Start: 2025-02-13 | End: 2025-02-23

## 2025-02-13 NOTE — PROGRESS NOTES
"Patient ID: Hiral Rees  : 1975    Chief Complaint: Sore Throat    Allergies: Patient is allergic to penicillin and chlorpheniramine-pseudoephed.     Subjective :  The patient is a 49 y.o. White female who presents to clinic for follow up on Sore Throat   HPI   History of Present Illness                Social History:  reports that she has never smoked. She has never used smokeless tobacco. She reports that she does not drink alcohol and does not use drugs.    Past Medical History:  has a past medical history of BMI 27.0-27.9,adult, Chronic sinusitis, unspecified, Hypertension, and Leukopenia.    Care Team: Patient Care Team:  Fredrick Mora APRN as PCP - General (Family Medicine)  Christine Murray NP as Nurse Practitioner (Obstetrics and Gynecology)     Current Medications:  Current Outpatient Medications   Medication Instructions    clindamycin (CLEOCIN) 300 mg, Oral, Every 8 hours    EScitalopram oxalate (LEXAPRO) 10 mg, Oral, Nightly    lisinopriL 10 mg, Oral, Daily         Visit Vitals  /72 (BP Location: Right arm, Patient Position: Sitting)   Pulse 71   Temp 98.4 °F (36.9 °C) (Temporal)   Ht 5' 1.02" (1.55 m)   Wt 71.8 kg (158 lb 6.4 oz)   SpO2 98%   BMI 29.91 kg/m²       Physical Exam     Labs Reviewed:  Chemistry:  Lab Results   Component Value Date     2024    K 4.4 2024    BUN 15 2024    CREATININE 0.87 2024    EGFRNORACEVR 81 2024    GLUCOSE 94 2023    CALCIUM 9.0 2024    ALKPHOS 96 2023    LABPROT 7.5 2023    ALBUMIN 4.3 2024    AST 24 2024    ALT 21 2024    TSH 3.170 2024        Lab Results   Component Value Date    HGBA1C 5.4 2024        Hematology:  Lab Results   Component Value Date    WBC 4.1 2024    RBC 4.37 2024    HGB 12.8 2024    HCT 40.3 2024    MCV 92 2024    MCH 29.3 2024    MCHC 31.8 2024    RDW 12.1 2024     2024    MPV " 10.3 03/12/2024    MONO 12 11/12/2024    MONO 0.5 11/12/2024    BASO 0.0 11/12/2024    EOSINOPHIL 7 11/12/2024    BASOPHIL 1 11/12/2024       Lipid Panel:  Lab Results   Component Value Date    CHOL 191 11/12/2024    HDL 57 11/12/2024    LDLCALC 110 (H) 11/12/2024    LDLDIRECT 75.1 11/09/2023    TRIG 138 11/12/2024        Diagnosis:  1. Infected nailbed of finger, right  -     clindamycin (CLEOCIN) 300 MG capsule; Take 1 capsule (300 mg total) by mouth every 8 (eight) hours. for 10 days  Dispense: 30 capsule; Refill: 0    2. Acute upper respiratory infection  Comments:  discussed symptom management.    3. Sore throat  -     POCT Influenza A/B Molecular  -     POCT COVID-19 Rapid Screening         Assessment & Plan              Future Appointments   Date Time Provider Department Center   11/18/2025  8:30 AM [facility] LABORATORY DRAW STATION [facility]   11/25/2025 10:00 AM Fredrick Mora APRN [facility]       No follow-ups on file. Call sooner if needed.    SHELBY DOMINGUEZ             This note was generated with the assistance of ambient listening technology. Verbal consent was obtained by the patient and accompanying visitor(s) for the recording of patient appointment to facilitate this note. I attest to having reviewed and edited the generated note for accuracy, though some syntax or spelling errors may persist. Please contact the author of this note for any clarification.

## 2025-02-13 NOTE — PROGRESS NOTES
Patient ID: Hiral Rees  : 1975    Chief Complaint: Sore Throat    Allergies: Patient is allergic to penicillin and chlorpheniramine-pseudoephed.     Subjective :  The patient is a 49 y.o. White female who presents to clinic for follow up on Sore Throat   Sore Throat        History of Present Illness    HPI:  Patient reports being sick for approximately 2 days. The illness started with a sore throat, which she describes as similar in intensity to a previous episode. She has been taking medication for the sore throat, which has partially alleviated the pain but some discomfort persists. She also mentions having a mild runny nose and minimal coughing, described as occasional throat clearing.    In addition to the respiratory symptoms, she is concerned about an injury to her right ring finger. While cutting her nails, she accidentally pulled on the nail instead of cutting it. The finger is now painful when bumped, and she believes it to be infected. She has been treating it by soaking it in Epsom salt and applying Neosporin, then covering it. She has also been trying to let it air out. These self-care measures have not resulted in improvement.    She denies having pus come out of the infected finger, fever, significant nasal congestion, or ear pain.    MEDICAL HISTORY:  Patient has a history of hypertension. She also has a penicillin allergy that manifests as a rash.    FAMILY HISTORY:  Family history is significant for brother with Marfan Syndrome, who recently underwent heart surgery. Grandmother has a history of thyroid problems, which are not cancerous.    TEST RESULTS:  Patient recently underwent both flu and COVID-19 tests, both of which returned negative results.    ALLERGIES:  Patient is allergic to penicillin, which causes a rash.      ROS:  ROS as indicated in HPI.           Social History:  reports that she has never smoked. She has never used smokeless tobacco. She reports that she does not drink  "alcohol and does not use drugs.    Past Medical History:  has a past medical history of BMI 27.0-27.9,adult, Chronic sinusitis, unspecified, Hypertension, and Leukopenia.    Care Team: Patient Care Team:  Fredrick Mora APRN as PCP - General (Family Medicine)  Christine Murray, KRYSTAL as Nurse Practitioner (Obstetrics and Gynecology)     Current Medications:  Current Outpatient Medications   Medication Instructions    clindamycin (CLEOCIN) 300 mg, Oral, Every 8 hours    EScitalopram oxalate (LEXAPRO) 10 mg, Oral, Nightly    lisinopriL 10 mg, Oral, Daily         Visit Vitals  /72 (BP Location: Right arm, Patient Position: Sitting)   Pulse 71   Temp 98.4 °F (36.9 °C) (Temporal)   Ht 5' 1.02" (1.55 m)   Wt 71.8 kg (158 lb 6.4 oz)   SpO2 98%   BMI 29.91 kg/m²       Physical Exam  Vitals reviewed.   Constitutional:       General: She is not in acute distress.     Appearance: Normal appearance.   HENT:      Head: Normocephalic and atraumatic.      Right Ear: Tympanic membrane, ear canal and external ear normal.      Left Ear: Tympanic membrane, ear canal and external ear normal.      Nose: Rhinorrhea present. No congestion. Rhinorrhea is clear.      Mouth/Throat:      Mouth: Mucous membranes are moist.      Pharynx: Oropharynx is clear. No oropharyngeal exudate or posterior oropharyngeal erythema.   Eyes:      Conjunctiva/sclera: Conjunctivae normal.      Pupils: Pupils are equal, round, and reactive to light.   Cardiovascular:      Rate and Rhythm: Normal rate and regular rhythm.      Pulses: Normal pulses.      Heart sounds: No murmur heard.  Pulmonary:      Effort: Pulmonary effort is normal.      Breath sounds: Normal breath sounds.   Musculoskeletal:         General: Swelling (right 4th finger, see image documentation, overgrowth of tissue, no drainage, +erythema and tenderness) and tenderness present.   Lymphadenopathy:      Cervical: No cervical adenopathy.   Skin:     General: Skin is warm and dry.      " Capillary Refill: Capillary refill takes less than 2 seconds.      Coloration: Skin is not jaundiced.      Findings: No rash.   Neurological:      Mental Status: She is alert and oriented to person, place, and time.      Cranial Nerves: No cranial nerve deficit.   Psychiatric:         Mood and Affect: Mood normal.         Behavior: Behavior normal.          Labs Reviewed:  Chemistry:  Lab Results   Component Value Date     11/12/2024    K 4.4 11/12/2024    BUN 15 11/12/2024    CREATININE 0.87 11/12/2024    EGFRNORACEVR 81 11/12/2024    GLUCOSE 94 11/09/2023    CALCIUM 9.0 11/12/2024    ALKPHOS 96 11/09/2023    LABPROT 7.5 11/09/2023    ALBUMIN 4.3 11/12/2024    AST 24 11/12/2024    ALT 21 11/12/2024    TSH 3.170 11/12/2024        Lab Results   Component Value Date    HGBA1C 5.4 11/12/2024        Hematology:  Lab Results   Component Value Date    WBC 4.1 11/12/2024    RBC 4.37 11/12/2024    HGB 12.8 11/12/2024    HCT 40.3 11/12/2024    MCV 92 11/12/2024    MCH 29.3 11/12/2024    MCHC 31.8 11/12/2024    RDW 12.1 11/12/2024     11/12/2024    MPV 10.3 03/12/2024    MONO 12 11/12/2024    MONO 0.5 11/12/2024    BASO 0.0 11/12/2024    EOSINOPHIL 7 11/12/2024    BASOPHIL 1 11/12/2024       Lipid Panel:  Lab Results   Component Value Date    CHOL 191 11/12/2024    HDL 57 11/12/2024    LDLCALC 110 (H) 11/12/2024    LDLDIRECT 75.1 11/09/2023    TRIG 138 11/12/2024        Diagnosis:  1. Infected nailbed of finger, right  Comments:  continue warm soaks, complete abx as prescribed  Orders:  -     clindamycin (CLEOCIN) 300 MG capsule; Take 1 capsule (300 mg total) by mouth every 8 (eight) hours. for 10 days  Dispense: 30 capsule; Refill: 0    2. Acute upper respiratory infection  Comments:  (-) flu and covid, discussed OTC symptom management.    3. Sore throat  -     POCT Influenza A/B Molecular  -     POCT COVID-19 Rapid Screening         Assessment & Plan    L03.011 Infected nailbed of finger, right  J06.9 Acute  upper respiratory infection  J02.9 Sore throat    IMPRESSION:  - Assessed viral upper respiratory infection, treating symptomatically as flu and COVID tests were negative  - Evaluated infected right ring finger, likely due to ingrown nail with inflamed surrounding tissue  - Considered patient's penicillin allergy when selecting antibiotic for finger infection    L03.011 INFECTED NAILBED OF FINGER, RIGHT:  - Instructed patient to continue warm soaks for the infected finger.  - Prescribed Clindamycin 300mg 3 times daily for 10 days.  - Documented the infection with a photograph for the medical record.  - Advised patient to contact the office if the condition worsens.    J06.9 ACUTE UPPER RESPIRATORY INFECTION:  - Educated patient about the viral nature of the illness, typical symptom duration of 7-10 days, and to contact the office if symptoms persist beyond this period.         Future Appointments   Date Time Provider Department Center   11/18/2025  8:30 AM LAB, [facility] LABORATORY DRAW STATION [facility] CHRIS [place]   11/25/2025 10:00 AM Fredrick Mora APRN [facility]       Follow up if symptoms worsen or fail to improve, for Keep Apt as Scheduled. Call sooner if needed.    SHELBY DOMINGUEZ             This note was generated with the assistance of ambient listening technology. Verbal consent was obtained by the patient and accompanying visitor(s) for the recording of patient appointment to facilitate this note. I attest to having reviewed and edited the generated note for accuracy, though some syntax or spelling errors may persist. Please contact the author of this note for any clarification.

## 2025-08-04 ENCOUNTER — OFFICE VISIT (OUTPATIENT)
Dept: OBSTETRICS AND GYNECOLOGY | Facility: CLINIC | Age: 50
End: 2025-08-04
Payer: COMMERCIAL

## 2025-08-04 VITALS
WEIGHT: 161 LBS | BODY MASS INDEX: 30.4 KG/M2 | DIASTOLIC BLOOD PRESSURE: 94 MMHG | HEIGHT: 61 IN | SYSTOLIC BLOOD PRESSURE: 156 MMHG

## 2025-08-04 DIAGNOSIS — Z11.3 SCREENING FOR STD (SEXUALLY TRANSMITTED DISEASE): ICD-10-CM

## 2025-08-04 DIAGNOSIS — N95.0 POSTMENOPAUSAL BLEEDING: ICD-10-CM

## 2025-08-04 DIAGNOSIS — Z01.419 ROUTINE GYNECOLOGICAL EXAMINATION: Primary | ICD-10-CM

## 2025-08-04 PROCEDURE — 3080F DIAST BP >= 90 MM HG: CPT | Mod: CPTII,,, | Performed by: NURSE PRACTITIONER

## 2025-08-04 PROCEDURE — 99386 PREV VISIT NEW AGE 40-64: CPT | Mod: ,,, | Performed by: NURSE PRACTITIONER

## 2025-08-04 PROCEDURE — 3008F BODY MASS INDEX DOCD: CPT | Mod: CPTII,,, | Performed by: NURSE PRACTITIONER

## 2025-08-04 PROCEDURE — 4010F ACE/ARB THERAPY RXD/TAKEN: CPT | Mod: CPTII,,, | Performed by: NURSE PRACTITIONER

## 2025-08-04 PROCEDURE — 1159F MED LIST DOCD IN RCRD: CPT | Mod: CPTII,,, | Performed by: NURSE PRACTITIONER

## 2025-08-04 PROCEDURE — 3077F SYST BP >= 140 MM HG: CPT | Mod: CPTII,,, | Performed by: NURSE PRACTITIONER

## 2025-08-04 PROCEDURE — 1160F RVW MEDS BY RX/DR IN RCRD: CPT | Mod: CPTII,,, | Performed by: NURSE PRACTITIONER

## 2025-08-04 NOTE — PROGRESS NOTES
Chief Complaint:   postmenopausal bleeding     History of Present Illness:  Hiral Rees is a 49 y.o. year old  presents for her well woman exam. C/o postmenopausal bleeding starting this past Saturday with cramping. Admits to increase stress.    Last pap: 21 NIL neg HPV  MM25 benign    Cancer-related family history includes Breast cancer in her paternal grandmother. There is no history of Cervical cancer, Ovarian cancer, or Uterine cancer.        Gyn History:  Menstrual History  Cycle: No ()  Menarche Age: 1 years    Menopause  Menopause Age: 1 years  Post Menopausal Bleeding: Yes  Hormone Replacement Therapy: No    Pap History  Last pap date: 21  Result: Normal (NIL)  History of Abnormal Pap: (!) Yes (per pt)  Treatment used: Colpo  HPV Vaccine Completed: No    [place]  Sexually Active: No  STI History: No  Contraception: No    Breast History  Last Breast Imaging Date: Yes  Date: 25 (benign)  History of Abnormal Breast Imaging : No  History of Breast Biopsy: No        Review of Systems:  General/Constitutional: Chills denies. Fatigue/weakness denies. Fever denies. Night sweats denies. Hot flashes denies    Respiratory: Cough denies. Hemoptysis denies. SOB denies. Sputum production denies. Wheezing denies .   Cardiovascular: Chest pain denies. Dizziness denies. Palpitations denies. Swelling in hands/feet denies.                Breast: Dimpling denies. Breast mass denies. Breast pain/tenderness denies. Nipple discharge denies. Puckering denies.  Gastrointestinal: Abdominal pain denies. Blood in stool denies. Constipation denies. Diarrhea denies. Heartburn denies. Nausea denies. Vomiting denies    Genitourinary: Incontinence denies. Blood in urine denies. Frequent urination denies. Painful urination denies. Urinary urgency denies. Nocturia denies    Gynecologic: Irregular bleeding danisha. Heavy bleeding denies. Painful menses denies. Vaginal discharge denies. Vaginal odor  "denies. Vaginal itching denies. Vaginal lesion denies. Pelvic pain denies. Decreased libido denies. Vulvar lesion denies. Prolapse of genital organs denies. Painful intercourse denies. Postcoital bleeding denies    Psychiatric: Depression denies. Anxiety denies.     OB History    Para Term  AB Living   0 0 0 0 0 0   SAB IAB Ectopic Multiple Live Births   0 0 0 0 0      The patient has never been pregnant.    Past Medical History:   Diagnosis Date    BMI 27.0-27.9,adult     Chronic sinusitis, unspecified     Hypertension     Leukopenia      Current Medications[1]    Review of patient's allergies indicates:   Allergen Reactions    Penicillin      Other reaction(s): rash    Chlorpheniramine-pseudoephed Palpitations       Past Surgical History:   Procedure Laterality Date    CHOLECYSTECTOMY       Family History   Problem Relation Name Age of Onset    Breast cancer Paternal Grandmother      Diabetes type II Maternal Grandmother      Hypertension Mother Ashley     Stroke Mother Ashley     Cervical cancer Neg Hx      Ovarian cancer Neg Hx      Uterine cancer Neg Hx      Colon cancer Neg Hx       Social History[2]    Physical Exam:  BP (!) 156/94 (BP Location: Left arm, Patient Position: Sitting)   Ht 5' 1" (1.549 m)   Wt 73 kg (161 lb)   BMI 30.42 kg/m²     Chaperone: present.       General appearance: healthy, well-nourished and well-developed     Psychiatric: Orientation to time, place and person. Normal mood and affect and active, alert     Skin: Appearance: no rashes or lesions.     Neck:   Neck: supple, FROM, trachea midline. and no masses   Thyroid: no enlargement or nodules and non-tender.       Cardiovascular:   Auscultation: RRR and no murmur.   Peripheral Vascular: no varicosities, LLE edema, RLE edema, calf tenderness, and palpable cord and pedal pulses intact.     Lungs:   Respiratory effort: no intercostal retractions or accessory muscle usage.   Auscultation: no wheezing, rales/crackles, or " rhonchi and clear to auscultation.     Breast:   Inspection/Palpation: no tenderness, discrete/distinct masses, skin changes, or abnormal secretions. Nipple appearance normal. +bilateral fibrocystic changes    Abdomen:   Auscultation/Inspection/Palpation: no hepatomegaly, splenomegaly, masses, tenderness or CVA tenderness and soft, non-distended bowel sounds preset.    Hernia: no palpable hernias.     Female Genitalia:    Vulva: no masses, tenderness or lesions    Bladder/Urethra: no urethral discharge or mass, normal meatus, bladder non-distended.    Vagina: no tenderness, erythema, cystocele, rectocele, abnormal vaginal discharge or vesicle(s) or ulcers    Cervix: no discharge, no cervical lacerations noted or motion tenderness and grossly normal    Uterus: normal size and shape and midline, non-tender, and no uterine prolapse.    Adnexa/Parametria: no parametrial tenderness or mass, no adnexal tenderness or ovarian mass.     Lymph Nodes:   Palpation: non tender submandibular nodes, axillary nodes, or inguinal nodes.     Rectal Exam:   Rectum: normal perianal skin.       Assessment/Plan:  1. Routine gynecological examination  PAP CT/NG/TV  Counseled regarding contraceptive options, and need for contraception until no menses for 1 year.  Reviewed calcium needs, exercise, and prevention of osteoporosis.  Healthy diet and light weightbearing exercise if tolerated.  Reviewed normal perimenopausal transition.  Mammogram recommended yearly.  Colonoscopy recommended at age 45.  RTC 1 y    2. Postmenopausal bleeding  Educated  Pelvic US - 8/6/25 @9am  Cultures: CT/NG/TV  Will have pt follow results with Dr. Griffin    -     US Pelvis Comp with Transvag NON-OB (xpd; Future           This note was transcribed by Marleni Cummings MA. There may be transcription errors as a result, however minimal. I agree with transcription and every effort has been made to ensure accuracy of transcription, but any obvious errors or omissions  should be clarified with the author of the document.    I agree with the above documentation              [1]   Current Outpatient Medications:     EScitalopram oxalate (LEXAPRO) 10 MG tablet, Take 1 tablet (10 mg total) by mouth every evening., Disp: 90 tablet, Rfl: 3    lisinopriL 10 MG tablet, Take 1 tablet (10 mg total) by mouth once daily., Disp: 90 tablet, Rfl: 3  [2]   Social History  Socioeconomic History    Marital status:    Tobacco Use    Smoking status: Never    Smokeless tobacco: Never   Substance and Sexual Activity    Alcohol use: Never    Drug use: Never    Sexual activity: Never     Social Drivers of Health     Financial Resource Strain: Low Risk  (11/16/2024)    Overall Financial Resource Strain (CARDIA)     Difficulty of Paying Living Expenses: Not very hard   Food Insecurity: No Food Insecurity (11/16/2024)    Hunger Vital Sign     Worried About Running Out of Food in the Last Year: Never true     Ran Out of Food in the Last Year: Never true   Transportation Needs: No Transportation Needs (4/22/2024)    PRAPARE - Transportation     Lack of Transportation (Medical): No     Lack of Transportation (Non-Medical): No   Physical Activity: Unknown (11/16/2024)    Exercise Vital Sign     Days of Exercise per Week: Patient declined   Stress: No Stress Concern Present (11/16/2024)    [employer] - Occupational Stress Questionnaire     Feeling of Stress : Not at all   Housing Stability: Unknown (11/16/2024)    Housing Stability Vital Sign     Unable to Pay for Housing in the Last Year: No

## 2025-08-06 ENCOUNTER — HOSPITAL ENCOUNTER (OUTPATIENT)
Dept: RADIOLOGY | Facility: HOSPITAL | Age: 50
Discharge: HOME | End: 2025-08-06
Attending: NURSE PRACTITIONER
Payer: COMMERCIAL

## 2025-08-06 DIAGNOSIS — N95.0 POSTMENOPAUSAL BLEEDING: ICD-10-CM

## 2025-08-06 PROCEDURE — 76830 TRANSVAGINAL US NON-OB: CPT | Mod: TC

## 2025-08-08 LAB
CHLAMYDIA TRACHOMATIS: NEGATIVE
HIGH RISK HPV: NEGATIVE
NEISSERIA GONORRHOEAE: NEGATIVE
PSYCHE PATHOLOGY RESULT: NORMAL
TRICHOMONAS VAGINALIS: NEGATIVE

## 2025-08-21 ENCOUNTER — OFFICE VISIT (OUTPATIENT)
Dept: OBSTETRICS AND GYNECOLOGY | Facility: CLINIC | Age: 50
End: 2025-08-21
Payer: COMMERCIAL

## 2025-08-21 VITALS
WEIGHT: 161.63 LBS | HEIGHT: 61 IN | BODY MASS INDEX: 30.51 KG/M2 | DIASTOLIC BLOOD PRESSURE: 82 MMHG | SYSTOLIC BLOOD PRESSURE: 140 MMHG

## 2025-08-21 DIAGNOSIS — N95.0 POSTMENOPAUSAL BLEEDING: Primary | ICD-10-CM

## 2025-08-21 DIAGNOSIS — N95.0 PMB (POSTMENOPAUSAL BLEEDING): ICD-10-CM

## 2025-08-21 PROCEDURE — 1159F MED LIST DOCD IN RCRD: CPT | Mod: CPTII,,, | Performed by: OBSTETRICS & GYNECOLOGY

## 2025-08-21 PROCEDURE — 3077F SYST BP >= 140 MM HG: CPT | Mod: CPTII,,, | Performed by: OBSTETRICS & GYNECOLOGY

## 2025-08-21 PROCEDURE — 99214 OFFICE O/P EST MOD 30 MIN: CPT | Mod: ,,, | Performed by: OBSTETRICS & GYNECOLOGY

## 2025-08-21 PROCEDURE — 3079F DIAST BP 80-89 MM HG: CPT | Mod: CPTII,,, | Performed by: OBSTETRICS & GYNECOLOGY

## 2025-08-21 PROCEDURE — 3008F BODY MASS INDEX DOCD: CPT | Mod: CPTII,,, | Performed by: OBSTETRICS & GYNECOLOGY

## 2025-08-21 PROCEDURE — 4010F ACE/ARB THERAPY RXD/TAKEN: CPT | Mod: CPTII,,, | Performed by: OBSTETRICS & GYNECOLOGY

## 2025-08-21 RX ORDER — MUPIROCIN 20 MG/G
OINTMENT TOPICAL
OUTPATIENT
Start: 2025-08-21

## 2025-08-21 RX ORDER — SODIUM CHLORIDE 9 MG/ML
INJECTION, SOLUTION INTRAVENOUS CONTINUOUS
OUTPATIENT
Start: 2025-08-21

## 2025-08-21 RX ORDER — FAMOTIDINE 20 MG/1
20 TABLET, FILM COATED ORAL
OUTPATIENT
Start: 2025-08-21